# Patient Record
Sex: FEMALE | Race: WHITE | Employment: UNEMPLOYED | URBAN - NONMETROPOLITAN AREA
[De-identification: names, ages, dates, MRNs, and addresses within clinical notes are randomized per-mention and may not be internally consistent; named-entity substitution may affect disease eponyms.]

---

## 2023-02-01 ENCOUNTER — OUTSIDE SERVICES (OUTPATIENT)
Dept: FAMILY MEDICINE CLINIC | Age: 88
End: 2023-02-01

## 2023-02-01 VITALS
HEART RATE: 64 BPM | RESPIRATION RATE: 20 BRPM | SYSTOLIC BLOOD PRESSURE: 142 MMHG | BODY MASS INDEX: 22.5 KG/M2 | DIASTOLIC BLOOD PRESSURE: 65 MMHG | TEMPERATURE: 98.4 F | WEIGHT: 111.4 LBS | OXYGEN SATURATION: 95 %

## 2023-02-01 DIAGNOSIS — K21.00 GASTROESOPHAGEAL REFLUX DISEASE WITH ESOPHAGITIS WITHOUT HEMORRHAGE: ICD-10-CM

## 2023-02-01 DIAGNOSIS — N17.0 ACUTE KIDNEY FAILURE WITH TUBULAR NECROSIS (HCC): ICD-10-CM

## 2023-02-01 DIAGNOSIS — F03.A3 MILD DEMENTIA WITH MOOD DISTURBANCE, UNSPECIFIED DEMENTIA TYPE: ICD-10-CM

## 2023-02-01 DIAGNOSIS — E78.2 MIXED HYPERLIPIDEMIA: ICD-10-CM

## 2023-02-01 DIAGNOSIS — I10 PRIMARY HYPERTENSION: ICD-10-CM

## 2023-02-01 DIAGNOSIS — N30.00 ACUTE CYSTITIS WITHOUT HEMATURIA: Primary | ICD-10-CM

## 2023-02-01 DIAGNOSIS — E87.1 HYPONATREMIA: ICD-10-CM

## 2023-02-01 ASSESSMENT — ENCOUNTER SYMPTOMS
SHORTNESS OF BREATH: 0
NAUSEA: 0
DIARRHEA: 0
VOMITING: 0
CONSTIPATION: 0

## 2023-02-01 NOTE — PROGRESS NOTES
Royal Hilton is a 80 y.o. female who presents today for her medical conditions/complaints as noted below. Chief Complaint   Patient presents with    Other     Medication review           HPI:     Is seen and examined in her room today for medication review after a recent hospitalization from 1/27 - 2/1/2023. Patient presented to [facility] on 1/27/2023 with urinary tract infection, hyponatremia, altered mental status. Her UA in the emergency department was noted to be white blood cells 51-75, positive for leuk esterase, 4+ bacteria. Her initial troponin was elevated at 70 and her EKG did not show any ST changes. Imaging study showed a compression fracture of T12 and L2 noted unchanged and moderate compression fracture of L1 of intermediate chronicity. She was started on IV antibiotics and IV fluids. Her white blood cell count was 9.1 H&H stable at 11.4/33.9, sodium was low at 128, potassium stable at 3.9, BUN 71, creatinine 2.69 with a GFR of 17. She was admitted for acute on chronic renal failure with acute tubular necrosis. Her valsartan/hydrochlorothiazide was held during admission. She was started on IV ceftriaxone and they trended her troponins as they felt this was related to kidney function. Her renal failure did improve and her creatinine baseline is 1.37. Her hyponatremia did resolve. She did have bacteremia which was positive for E. coli and Enterobacter. She was started on oral antibiotics and discharged here. Patient's vitamin D level was low and was started on vitamin D. Her most recent BUN and creatinine noted in the hospital was 38/1.71 and her white blood cell count was between 5 and 6. Her sodium improved to 139.     Past Medical History:   Diagnosis Date    Heartburn     Hyperlipidemia     Hypertension       Past Surgical History:   Procedure Laterality Date    DILATION AND CURETTAGE OF UTERUS  1960's    FOOT SURGERY      FOOT SURGERY Left 7/17/14    tendon repair GASTROCNEMIUS RECESSION Left 1-14-16    STARR hernandez    HAMMER TOE SURGERY         Family History   Problem Relation Age of Onset    Diabetes Mother     Heart Disease Father        Social History     Tobacco Use    Smoking status: Never    Smokeless tobacco: Never   Substance Use Topics    Alcohol use: Yes     Comment: social      Allergies   Allergen Reactions    Bee Venom Swelling     ARM SWELLING    Oxycodone Nausea And Vomiting       Health Maintenance   Topic Date Due    COVID-19 Vaccine (1) Never done    Lipids  Never done    Depression Screen  Never done    DTaP/Tdap/Td vaccine (1 - Tdap) Never done    Shingles vaccine (1 of 2) Never done    Pneumococcal 65+ years Vaccine (1 - PCV) Never done    Flu vaccine (1) Never done    Hepatitis A vaccine  Aged Out    Hib vaccine  Aged Out    Meningococcal (ACWY) vaccine  Aged Out       Subjective:      Review of Systems   Unable to perform ROS: Other (memory loss, limited)   Constitutional:  Positive for activity change and fatigue. Negative for appetite change and unexpected weight change. HENT:  Positive for hearing loss. Eyes:  Positive for visual disturbance. Respiratory:  Negative for shortness of breath. Cardiovascular:  Negative for leg swelling. Gastrointestinal:  Negative for constipation, diarrhea, nausea and vomiting. Genitourinary:  Negative for dysuria and frequency. Musculoskeletal:  Positive for arthralgias and gait problem. Skin:  Positive for pallor. Neurological:  Positive for weakness. Psychiatric/Behavioral:  Positive for confusion. Objective:     Physical Exam  Vitals and nursing note reviewed. Exam conducted with a chaperone present. Constitutional:       General: She is not in acute distress. Appearance: Normal appearance. She is not ill-appearing. HENT:      Head: Normocephalic and atraumatic.       Right Ear: Hearing normal.      Left Ear: Hearing normal.      Nose: Nose normal.   Eyes:      General: Lids are normal.   Cardiovascular:      Rate and Rhythm: Normal rate and regular rhythm. Heart sounds: Normal heart sounds, S1 normal and S2 normal.   Pulmonary:      Effort: Pulmonary effort is normal. No tachypnea or bradypnea. Breath sounds: Normal breath sounds. Abdominal:      General: Abdomen is flat. Bowel sounds are normal.      Palpations: Abdomen is soft. Tenderness: There is no abdominal tenderness. Musculoskeletal:      Cervical back: Normal range of motion and neck supple. Right lower leg: No edema. Left lower leg: No edema. Skin:     General: Skin is warm and dry. Neurological:      Mental Status: She is alert. Mental status is at baseline. She is disoriented and confused. Psychiatric:         Mood and Affect: Affect is labile. Speech: Speech is delayed. Cognition and Memory: Memory is impaired. She exhibits impaired remote memory. BP (!) 142/65   Pulse 64   Temp 98.4 °F (36.9 °C)   Resp 20   Wt 111 lb 6.4 oz (50.5 kg)   SpO2 95%   BMI 22.50 kg/m²     Assessment/Plan      1. Acute cystitis without hematuria -sent hospitalization and currently on cefdinir. Continue to encourage fluids. 2. Mixed hyperlipidemia -chronic and continue current dose of rosuvastatin and continue heart healthy diet. 3. Primary hypertension -blood pressures overall controlled on current regimen of Norvasc and losartan/hydrochlorothiazide. We will repeat BMP next week. 4. Hyponatremia -sodium returned to normal.  We will repeat BMP next week. Monitor for confusion. 5. Acute kidney failure with tubular necrosis (HCC) -resolved overall. Baseline creatinine is 1.37. We will continue to monitor and repeat next week. 6. Mild dementia with mood disturbance, unspecified dementia type -speech therapy to eval and treat. 7. Gastroesophageal reflux disease with esophagitis without hemorrhage -chronic and continue lansoprazole. Denies any GI complaints.   Per medical record patient has lost 40 pounds in the last several years. Patient seen and examined. History partially obtained by chart review and nursing notes. I have reviewed patient's past medical, surgical, social, and family history and have made updates where appropriate. See facility EMR for updated medication list.      More than 50% of the total visit time must involve counseling/coordination of care. The total visit time encompasses both the face-to-face time spent with the patient at the bedside and the additional time spent on the unit/floor reviewing data, obtaining relevant patient information, and discussing the case with other involved healthcare providers. On this date [unfilled] I have spent 48 minutes reviewing previous notes, test results and face to face with the patient discussing the diagnosis and importance of compliance with the treatment plan as well as documenting on the day of the visit.      Electronically signed by SPENSER Do CNP on 2/1/2023 at 12:55 PM

## 2023-02-07 ENCOUNTER — OUTSIDE SERVICES (OUTPATIENT)
Dept: FAMILY MEDICINE CLINIC | Age: 88
End: 2023-02-07

## 2023-02-07 VITALS
WEIGHT: 106.2 LBS | BODY MASS INDEX: 21.45 KG/M2 | RESPIRATION RATE: 20 BRPM | OXYGEN SATURATION: 94 % | SYSTOLIC BLOOD PRESSURE: 138 MMHG | DIASTOLIC BLOOD PRESSURE: 65 MMHG | HEART RATE: 58 BPM | TEMPERATURE: 97.8 F

## 2023-02-07 DIAGNOSIS — E78.2 MIXED HYPERLIPIDEMIA: ICD-10-CM

## 2023-02-07 DIAGNOSIS — N17.0 ACUTE KIDNEY FAILURE WITH TUBULAR NECROSIS (HCC): ICD-10-CM

## 2023-02-07 DIAGNOSIS — E87.1 HYPONATREMIA: ICD-10-CM

## 2023-02-07 DIAGNOSIS — F03.A3 MILD DEMENTIA WITH MOOD DISTURBANCE, UNSPECIFIED DEMENTIA TYPE: ICD-10-CM

## 2023-02-07 DIAGNOSIS — N30.00 ACUTE CYSTITIS WITHOUT HEMATURIA: Primary | ICD-10-CM

## 2023-02-07 DIAGNOSIS — K21.00 GASTROESOPHAGEAL REFLUX DISEASE WITH ESOPHAGITIS WITHOUT HEMORRHAGE: ICD-10-CM

## 2023-02-07 DIAGNOSIS — I10 PRIMARY HYPERTENSION: ICD-10-CM

## 2023-02-07 ASSESSMENT — ENCOUNTER SYMPTOMS
DIARRHEA: 0
VOMITING: 0
BACK PAIN: 1
COUGH: 0
NAUSEA: 0
CONSTIPATION: 0
CHEST TIGHTNESS: 0
SHORTNESS OF BREATH: 0
CHOKING: 0

## 2023-02-07 NOTE — PROGRESS NOTES
Kojo Mota is a 80 y.o. female who presents today for her medical conditions/complaints as noted below. Chief Complaint   Patient presents with    New Patient     Admission to the facility for skilled stay           HPI:     HPI  Patient is seen and examined in her room today for admission to the facility. She initially presented to the hospital on 1/27/2023 with altered mental status. In the ER, her UA showed white blood cells 51-75, positive for leuk esterase, 4+ bacteria. Her initial troponin level was elevated at 70 and her EKG did not show any ST changes. Imaging showed compression fracture of T12 and L2 which was noted unchanged and moderate compression fracture of L1 which was intermediate chronicity. She was started on IV fluids and antibiotics. Her white blood cell count was 9.1 and her hemoglobin was 11.4. Sodium was noted to be low at 128 and her BUN was 71 with creatinine of 2.69. Her GFR was 17. Her potassium was normal at 3.9. She was admitted for acute on chronic kidney failure with acute tubular necrosis. During admission, her valsartan/hydrochlorothiazide was held and she was started on IV ceftriaxone. Her troponins did trend down and this was thought to be related to her renal failure. Labs did improve and her creatinine returned to baseline at 1.37. Hyponatremia did resolve. She was found to have bacteremia positive for E. coli and Enterobacter. She was placed on oral antibiotics after her IV antibiotic course and she was discharged to this facility. He was also started on vitamin D supplementation due to her low vitamin D levels. Today, patient states that she is feeling pretty good. She does complain of a mild ache in her low back but states this is tolerable. She is scooted down in her bed and does request to be moved up. She denies any dysuria. Denies chest pain or shortness of breath. Says she has been sleeping well and her bowels are working well.   Labs were drawn from the facility yesterday and her GFR had improved to 42 and creatinine down to 1.4. Her electrolytes were normal.  No further concerns per patient or staff. Past Medical History:   Diagnosis Date    Heartburn     Hyperlipidemia     Hypertension       Past Surgical History:   Procedure Laterality Date    DILATION AND CURETTAGE OF UTERUS  1960's    FOOT SURGERY      FOOT SURGERY Left 7/17/14    tendon repair    GASTROCNEMIUS RECESSION Left 1-14-16    STARR arthroeris    HAMMER TOE SURGERY         Family History   Problem Relation Age of Onset    Diabetes Mother     Heart Disease Father        Social History     Tobacco Use    Smoking status: Never    Smokeless tobacco: Never   Substance Use Topics    Alcohol use: Yes     Comment: social      Allergies   Allergen Reactions    Bee Venom Swelling     ARM SWELLING    Oxycodone Nausea And Vomiting       Health Maintenance   Topic Date Due    COVID-19 Vaccine (1) Never done    Lipids  Never done    Depression Screen  Never done    DTaP/Tdap/Td vaccine (1 - Tdap) Never done    Shingles vaccine (1 of 2) Never done    Pneumococcal 65+ years Vaccine (1 - PCV) Never done    Flu vaccine (1) Never done    Hepatitis A vaccine  Aged Out    Hib vaccine  Aged Out    Meningococcal (ACWY) vaccine  Aged Out       Subjective:      Review of Systems   Unable to perform ROS: Other (limited due to dementia)   Constitutional:  Positive for activity change (starting to improve) and fatigue. Negative for appetite change and unexpected weight change. HENT:  Positive for hearing loss. Eyes:  Positive for visual disturbance. Respiratory:  Negative for cough, choking, chest tightness and shortness of breath. Cardiovascular:  Negative for leg swelling. Gastrointestinal:  Negative for constipation, diarrhea, nausea and vomiting. Genitourinary:  Negative for decreased urine volume, dysuria, frequency and urgency.    Musculoskeletal:  Positive for arthralgias, back pain (achy) and gait problem. Skin:  Positive for pallor. Neurological:  Positive for weakness. Psychiatric/Behavioral:  Positive for confusion. Negative for dysphoric mood and sleep disturbance. The patient is not nervous/anxious. Objective:     Physical Exam  Vitals and nursing note reviewed. Exam conducted with a chaperone present. Constitutional:       General: She is not in acute distress. Appearance: Normal appearance. She is not ill-appearing. HENT:      Head: Normocephalic and atraumatic. Right Ear: Hearing normal.      Left Ear: Hearing normal.      Nose: Nose normal. No congestion. Mouth/Throat:      Mouth: Mucous membranes are moist.   Eyes:      General: Lids are normal.         Right eye: No discharge. Left eye: No discharge. Pupils: Pupils are equal, round, and reactive to light. Cardiovascular:      Rate and Rhythm: Normal rate and regular rhythm. Heart sounds: Normal heart sounds, S1 normal and S2 normal.   Pulmonary:      Effort: Pulmonary effort is normal. No tachypnea or bradypnea. Breath sounds: Normal breath sounds. Abdominal:      General: Abdomen is flat. Bowel sounds are normal.      Palpations: Abdomen is soft. Tenderness: There is no abdominal tenderness. Musculoskeletal:      Cervical back: Normal range of motion and neck supple. Right lower leg: No edema. Left lower leg: No edema. Skin:     General: Skin is warm and dry. Neurological:      Mental Status: She is alert. Mental status is at baseline. She is confused. Psychiatric:         Attention and Perception: Attention normal.         Mood and Affect: Mood normal.         Speech: Speech normal.         Behavior: Behavior normal.         Cognition and Memory: Memory is impaired. She exhibits impaired remote memory. /65   Pulse 58   Temp 97.8 °F (36.6 °C)   Resp 20   Wt 106 lb 3.2 oz (48.2 kg)   SpO2 94%   BMI 21.45 kg/m²     Assessment/Plan      1.  Acute cystitis without hematuria    2. Mixed hyperlipidemia    3. Primary hypertension    4. Hyponatremia    5. Acute kidney failure with tubular necrosis (HCC)    6. Mild dementia with mood disturbance, unspecified dementia type    7. Gastroesophageal reflux disease with esophagitis without hemorrhage      Therapy to eval and treat. Cont cefdinir. Cont statin and heart healthy diet  Controlled with norvasc and losartan/hctz. Repeat bmp reviewed from yesterday, labs improved  Resolved  Resolved, cont to monitor. Supportive care. Cont lansoprazole    Patient seen and examined. History partially obtained by chart review and nursing notes. I have reviewed patient's past medical, surgical, social, and family history and have made updates where appropriate. See facility EMR for updated medication list.      More than 50% of the total visit time must involve counseling/coordination of care. The total visit time encompasses both the face-to-face time spent with the patient at the bedside and the additional time spent on the unit/floor reviewing data, obtaining relevant patient information, and discussing the case with other involved healthcare providers. On this date 2/7/23 I have spent 45 minutes reviewing previous notes, test results and face to face with the patient discussing the diagnosis and importance of compliance with the treatment plan as well as documenting on the day of the visit.      Electronically signed by Libia Bustamante MD on 2/7/2023 at 11:44 AM

## 2023-08-10 ENCOUNTER — HOSPITAL ENCOUNTER (OUTPATIENT)
Dept: PHYSICAL THERAPY | Age: 88
Setting detail: THERAPIES SERIES
Discharge: HOME | End: 2023-08-10
Payer: MEDICARE

## 2023-08-10 ENCOUNTER — HOSPITAL ENCOUNTER (OUTPATIENT)
Dept: OCCUPATIONAL THERAPY | Age: 88
Setting detail: THERAPIES SERIES
Discharge: HOME | End: 2023-08-10
Payer: MEDICARE

## 2023-08-10 PROCEDURE — 97165 OT EVAL LOW COMPLEX 30 MIN: CPT

## 2023-08-10 NOTE — DISCHARGE SUMMARY
[facility] [address] [facility] THERAPY  DRIVING EVALUATION    PATIENT: Jennifer Peña OF BIRTH:  7/8/1933  GENDER:  female  CSN: [id number]   REFERRING PHYSICIAN:   Onel Kern PA-C   DIAGNOSIS:  memory changes   DRIVING HISTORY:    Pt with a hospital stay 1/27- 2/1/23 for UTI, AMS. Pt discharged to SNF for rehab after. Pt with h/o compression fractures of T12, L1 and L2. Pt reporting she fell approx 6-8 weeks and that's when she stopped driving. Pt reporting she would drive 4-5 miles to [address]. PMH: Please see medical history questionnaire for past medical history, allergies, and medications. PATIENT GOALS:    drive independently     OBJECTIVE  VISUAL SKILLS(using the [employer] Eyes 2000 Visual Evaluator)  NEAR VISUAL ACUITY:  Pass. FAR VISUAL ACUITY:   Pass. STEREO DEPTH:   not fully completed d/t pt no understanding directions . FUSION:    Pass. PERIPHERAL VISION:  Pass. *Pt reporting she wears her glasses at all times. Pt was recently at eye doctor in March 2023. Per daughter, pt does have cataracts in both eyes she thinks. Per daughter, doctor stating she is only suppose to drive during day. DYNAVISION TESTING:  completed 3 trials with 1st trial only using right UE. 2nd and 3rd trial with pt encouragement to use bilateral hands  1st: 22, 2nd: 27, 3rd: 29    PHYSICAL SKILLS  RANGE OF MOTION:Within functional limits for driving  STRENGTH: Within functional limits for driving  TRANSFER IN/OUT OF SIMULATOR: Within functional limits for driving  AMBULATION: Within functional limits for driving    IN VEHICLE MANIPULATION SKILLS:  Steering Wheel:Within functional limits for driving   Gas Pedal:  Within functional limits for driving  Brake Pedal: Within functional limits for driving  Turn Signal: Within functional limits for driving  Seat Belt: Within functional limits for driving     COGNITIVE SKILLS  ORIENTATION:

## 2023-08-15 ENCOUNTER — HOSPITAL ENCOUNTER (OUTPATIENT)
Dept: PHYSICAL THERAPY | Age: 88
Setting detail: THERAPIES SERIES
Discharge: HOME | End: 2023-08-15
Payer: MEDICARE

## 2023-08-15 PROCEDURE — 97163 PT EVAL HIGH COMPLEX 45 MIN: CPT

## 2024-10-30 ENCOUNTER — LAB (OUTPATIENT)
Dept: LAB | Age: 89
End: 2024-10-30

## 2024-10-31 LAB
BACTERIA UR CULT: ABNORMAL
ORGANISM: ABNORMAL

## 2024-12-26 ENCOUNTER — LAB (OUTPATIENT)
Dept: LAB | Age: 88
End: 2024-12-26

## 2024-12-26 LAB
BILIRUB UR QL STRIP.AUTO: NEGATIVE
CHARACTER UR: CLEAR
COLOR, UA: YELLOW
CREAT UR-MCNC: 123 MG/DL
GLUCOSE UR QL STRIP.AUTO: NEGATIVE MG/DL
HGB UR QL STRIP.AUTO: NEGATIVE
KETONES UR QL STRIP.AUTO: NEGATIVE
MICROALBUMIN UR-MCNC: < 1.2 MG/DL
MICROALBUMIN/CREAT RATIO PNL UR: 10 MG/G (ref 0–30)
NITRITE UR QL STRIP: NEGATIVE
PH UR STRIP.AUTO: 6.5 [PH] (ref 5–9)
PROT UR STRIP.AUTO-MCNC: NEGATIVE MG/DL
SP GR UR REFRACT.AUTO: 1.02 (ref 1–1.03)
UROBILINOGEN, URINE: 0.2 EU/DL (ref 0–1)
WBC #/AREA URNS HPF: NEGATIVE /[HPF]

## 2025-05-07 ENCOUNTER — LAB (OUTPATIENT)
Dept: LAB | Age: 89
End: 2025-05-07

## 2025-05-09 LAB
BACTERIA UR CULT: ABNORMAL
ORGANISM: ABNORMAL